# Patient Record
Sex: FEMALE | Race: WHITE | Employment: OTHER | URBAN - METROPOLITAN AREA
[De-identification: names, ages, dates, MRNs, and addresses within clinical notes are randomized per-mention and may not be internally consistent; named-entity substitution may affect disease eponyms.]

---

## 2017-06-28 ENCOUNTER — TELEPHONE (OUTPATIENT)
Dept: PODIATRY CLINIC | Facility: CLINIC | Age: 67
End: 2017-06-28

## 2017-06-28 NOTE — TELEPHONE ENCOUNTER
Called pt and she states she got a pedicure 3 months ago and since then she noticed her right hallux toenail has not been growing. She took the polish off and noticed a white/ foggy appearance on the nail.  Then on 6/26/17 the whole toe became painful aroun

## 2017-06-28 NOTE — TELEPHONE ENCOUNTER
Pt states great toe on L foot is in pain, states it is painful to walk, states nail is an odd color and has not been growing since she went for a pedicure. Pt requesting appt sooner than next available. Pls advise thank you.

## 2017-06-29 ENCOUNTER — OFFICE VISIT (OUTPATIENT)
Dept: PODIATRY CLINIC | Facility: CLINIC | Age: 67
End: 2017-06-29

## 2017-06-29 DIAGNOSIS — L03.012 PARONYCHIA, LEFT: Primary | ICD-10-CM

## 2017-06-29 PROCEDURE — G0463 HOSPITAL OUTPT CLINIC VISIT: HCPCS | Performed by: PODIATRIST

## 2017-06-29 PROCEDURE — 99203 OFFICE O/P NEW LOW 30 MIN: CPT | Performed by: PODIATRIST

## 2017-06-29 RX ORDER — LISINOPRIL 20 MG/1
20 TABLET ORAL
COMMUNITY

## 2017-06-29 RX ORDER — ALPRAZOLAM 1 MG/1
1 TABLET ORAL
COMMUNITY

## 2017-06-29 RX ORDER — TRIAMTERENE AND HYDROCHLOROTHIAZIDE 37.5; 25 MG/1; MG/1
1 CAPSULE ORAL
COMMUNITY

## 2017-06-29 RX ORDER — FOLIC ACID 1 MG/1
1 TABLET ORAL
COMMUNITY
End: 2017-06-29

## 2017-06-29 NOTE — PROGRESS NOTES
HPI:    Patient ID: Romeo Mina is a 69year old female. HPI  This 43-year-old female presents to the office today having not been seen in about 5 years. She port reports that during the last week or so the left great toe has become red and painful. Physical Exam  On physical exam pedal pulses are normal.  The distal aspect of this digit has some erythema. The nail is whitish superficially. There is palpable discomfort.   I was able to reduce this nail on the distal aspect and was unable to identif

## 2017-07-13 ENCOUNTER — OFFICE VISIT (OUTPATIENT)
Dept: PODIATRY CLINIC | Facility: CLINIC | Age: 67
End: 2017-07-13

## 2017-07-13 DIAGNOSIS — B35.1 ONYCHOMYCOSIS: ICD-10-CM

## 2017-07-13 DIAGNOSIS — L03.012 PARONYCHIA, LEFT: Primary | ICD-10-CM

## 2017-07-13 PROCEDURE — 99213 OFFICE O/P EST LOW 20 MIN: CPT | Performed by: PODIATRIST

## 2017-07-13 PROCEDURE — G0463 HOSPITAL OUTPT CLINIC VISIT: HCPCS | Performed by: PODIATRIST

## 2017-07-13 NOTE — PROGRESS NOTES
HPI:    Patient ID: Jaron Gonsalez is a 69year old female. HPI  This 68-year-old female called a few days after the last visit and requested an antibiotic. She was placed on Duricef 500 mg. Patient states today it is doing fine.   She removed all chris Allergies   PHYSICAL EXAM:   Physical Exam  On physical exam pedal pulses are normal.  There is no clinical evidence of edema nor erythema.   Lateral border of the left great toe demonstrates no edema nor erythema there is very minor discomfort but healing

## 2018-01-09 ENCOUNTER — OFFICE VISIT (OUTPATIENT)
Dept: RHEUMATOLOGY | Facility: CLINIC | Age: 68
End: 2018-01-09

## 2018-01-09 ENCOUNTER — HOSPITAL ENCOUNTER (OUTPATIENT)
Dept: GENERAL RADIOLOGY | Age: 68
Discharge: HOME | End: 2018-01-09
Attending: INTERNAL MEDICINE | Admitting: INTERNAL MEDICINE
Payer: MEDICARE

## 2018-01-09 VITALS
BODY MASS INDEX: 30.83 KG/M2 | DIASTOLIC BLOOD PRESSURE: 69 MMHG | SYSTOLIC BLOOD PRESSURE: 109 MMHG | HEART RATE: 94 BPM | WEIGHT: 174 LBS | HEIGHT: 63 IN

## 2018-01-09 DIAGNOSIS — M05.79 RHEUMATOID ARTHRITIS INVOLVING MULTIPLE SITES WITH POSITIVE RHEUMATOID FACTOR (HCC): ICD-10-CM

## 2018-01-09 DIAGNOSIS — Z51.81 ENCOUNTER FOR THERAPEUTIC DRUG MONITORING: ICD-10-CM

## 2018-01-09 DIAGNOSIS — M05.79 RHEUMATOID ARTHRITIS INVOLVING MULTIPLE SITES WITH POSITIVE RHEUMATOID FACTOR (HCC): Primary | ICD-10-CM

## 2018-01-09 DIAGNOSIS — M54.12 CERVICAL RADICULOPATHY: ICD-10-CM

## 2018-01-09 PROCEDURE — G0463 HOSPITAL OUTPT CLINIC VISIT: HCPCS | Performed by: INTERNAL MEDICINE

## 2018-01-09 PROCEDURE — 99204 OFFICE O/P NEW MOD 45 MIN: CPT | Performed by: INTERNAL MEDICINE

## 2018-01-09 PROCEDURE — 73130 X-RAY EXAM OF HAND: CPT | Performed by: INTERNAL MEDICINE

## 2018-01-09 RX ORDER — FOLIC ACID 1 MG/1
TABLET ORAL
Qty: 90 TABLET | Refills: 3 | Status: SHIPPED | OUTPATIENT
Start: 2018-01-09 | End: 2019-01-18

## 2018-01-09 NOTE — PROGRESS NOTES
Dear Dr. Dipti Rosenberg:    I saw your patient Pretty Bhatt in consultation this afternoon at your request for evaluation of rheumatoid arthritis. As you know, she is a delightful retired nurse, who developed arthritis starting in 2003.   She recalls it starting i creatinine of 1.23, alkaline phosphatase 139. CBC was normal.    Past medical history:  She has hypertension, high cholesterol, acid reflux, depression. Her right hip was replaced in November 2016 for osteoarthritis.   3-1/2 years ago she had a bulging di without hepatosplenomegaly or tenderness. Normal bowel sounds. No lower extremity edema. Neck motion is limited in all directions. Shoulders move well. Elbows and wrists are normal.  No obvious synovitis across her hand second or third MCP joints.   Gr

## 2018-01-26 ENCOUNTER — APPOINTMENT (OUTPATIENT)
Dept: LAB | Facility: HOSPITAL | Age: 68
End: 2018-01-26
Attending: INTERNAL MEDICINE
Payer: MEDICARE

## 2018-01-26 DIAGNOSIS — Z51.81 ENCOUNTER FOR THERAPEUTIC DRUG MONITORING: ICD-10-CM

## 2018-01-26 DIAGNOSIS — M05.79 RHEUMATOID ARTHRITIS INVOLVING MULTIPLE SITES WITH POSITIVE RHEUMATOID FACTOR (HCC): ICD-10-CM

## 2018-01-26 LAB
ALBUMIN SERPL BCP-MCNC: 3.5 G/DL (ref 3.5–4.8)
ALBUMIN/GLOB SERPL: 1.1 {RATIO} (ref 1–2)
ALP SERPL-CCNC: 112 U/L (ref 32–100)
ALT SERPL-CCNC: 25 U/L (ref 14–54)
ANION GAP SERPL CALC-SCNC: 12 MMOL/L (ref 0–18)
AST SERPL-CCNC: 25 U/L (ref 15–41)
BILIRUB SERPL-MCNC: 0.4 MG/DL (ref 0.3–1.2)
BUN SERPL-MCNC: 22 MG/DL (ref 8–20)
BUN/CREAT SERPL: 16.9 (ref 10–20)
CALCIUM SERPL-MCNC: 9.5 MG/DL (ref 8.5–10.5)
CHLORIDE SERPL-SCNC: 102 MMOL/L (ref 95–110)
CO2 SERPL-SCNC: 25 MMOL/L (ref 22–32)
CREAT SERPL-MCNC: 1.3 MG/DL (ref 0.5–1.5)
CRP SERPL-MCNC: 1.5 MG/DL (ref 0–0.9)
ERYTHROCYTE [DISTWIDTH] IN BLOOD BY AUTOMATED COUNT: 14.1 % (ref 11–15)
ERYTHROCYTE [SEDIMENTATION RATE] IN BLOOD: 34 MM/HR (ref 0–30)
GLOBULIN PLAS-MCNC: 3.2 G/DL (ref 2.5–3.7)
GLUCOSE SERPL-MCNC: 96 MG/DL (ref 70–99)
HCT VFR BLD AUTO: 36.2 % (ref 35–48)
HGB BLD-MCNC: 11.7 G/DL (ref 12–16)
MCH RBC QN AUTO: 29.9 PG (ref 27–32)
MCHC RBC AUTO-ENTMCNC: 32.5 G/DL (ref 32–37)
MCV RBC AUTO: 92 FL (ref 80–100)
OSMOLALITY UR CALC.SUM OF ELEC: 291 MOSM/KG (ref 275–295)
PLATELET # BLD AUTO: 296 K/UL (ref 140–400)
PMV BLD AUTO: 7.3 FL (ref 7.4–10.3)
POTASSIUM SERPL-SCNC: 4.2 MMOL/L (ref 3.3–5.1)
PROT SERPL-MCNC: 6.7 G/DL (ref 5.9–8.4)
RBC # BLD AUTO: 3.93 M/UL (ref 3.7–5.4)
RHEUMATOID FACT SER QL: 95 IU/ML
SODIUM SERPL-SCNC: 139 MMOL/L (ref 136–144)
WBC # BLD AUTO: 6.5 K/UL (ref 4–11)

## 2018-01-26 PROCEDURE — 86140 C-REACTIVE PROTEIN: CPT

## 2018-01-26 PROCEDURE — 86200 CCP ANTIBODY: CPT

## 2018-01-26 PROCEDURE — 86431 RHEUMATOID FACTOR QUANT: CPT

## 2018-01-26 PROCEDURE — 36415 COLL VENOUS BLD VENIPUNCTURE: CPT

## 2018-01-26 PROCEDURE — 85027 COMPLETE CBC AUTOMATED: CPT

## 2018-01-26 PROCEDURE — 85652 RBC SED RATE AUTOMATED: CPT

## 2018-01-26 PROCEDURE — 80053 COMPREHEN METABOLIC PANEL: CPT

## 2018-01-30 ENCOUNTER — OFFICE VISIT (OUTPATIENT)
Dept: RHEUMATOLOGY | Facility: CLINIC | Age: 68
End: 2018-01-30

## 2018-01-30 VITALS
BODY MASS INDEX: 30.8 KG/M2 | HEART RATE: 99 BPM | DIASTOLIC BLOOD PRESSURE: 69 MMHG | SYSTOLIC BLOOD PRESSURE: 113 MMHG | WEIGHT: 173.81 LBS | HEIGHT: 63 IN

## 2018-01-30 DIAGNOSIS — Z51.81 ENCOUNTER FOR THERAPEUTIC DRUG MONITORING: ICD-10-CM

## 2018-01-30 DIAGNOSIS — M05.79 RHEUMATOID ARTHRITIS INVOLVING MULTIPLE SITES WITH POSITIVE RHEUMATOID FACTOR (HCC): Primary | ICD-10-CM

## 2018-01-30 PROCEDURE — G0463 HOSPITAL OUTPT CLINIC VISIT: HCPCS | Performed by: INTERNAL MEDICINE

## 2018-01-30 PROCEDURE — 99213 OFFICE O/P EST LOW 20 MIN: CPT | Performed by: INTERNAL MEDICINE

## 2018-01-30 RX ORDER — CEFDINIR 300 MG/1
300 CAPSULE ORAL 2 TIMES DAILY
Refills: 0 | COMMUNITY
Start: 2018-01-26 | End: 2019-03-13

## 2018-01-30 NOTE — PROGRESS NOTES
Dear Dr. Lanette Cohen:    I saw Helio Rachel in follow-up this afternoon in my rheumatology clinic. She occasionally has swelling in her right third finger, and it can take all day to loosen. Her right arm can occasionally cramp.     She has not yet scheduled t

## 2018-01-31 LAB — CCP IGG SERPL-ACNC: >340 U/ML (ref 0–6.9)

## 2018-03-22 ENCOUNTER — LAB REQUISITION (OUTPATIENT)
Dept: LAB | Facility: HOSPITAL | Age: 68
End: 2018-03-22
Payer: MEDICARE

## 2018-03-22 DIAGNOSIS — L02.91 CUTANEOUS ABSCESS: ICD-10-CM

## 2018-03-22 PROCEDURE — 87075 CULTR BACTERIA EXCEPT BLOOD: CPT | Performed by: DERMATOLOGY

## 2018-03-22 PROCEDURE — 87205 SMEAR GRAM STAIN: CPT | Performed by: DERMATOLOGY

## 2018-03-22 PROCEDURE — 87070 CULTURE OTHR SPECIMN AEROBIC: CPT | Performed by: DERMATOLOGY

## 2018-05-18 ENCOUNTER — TELEPHONE (OUTPATIENT)
Dept: INTERNAL MEDICINE CLINIC | Facility: CLINIC | Age: 68
End: 2018-05-18

## 2018-05-18 NOTE — TELEPHONE ENCOUNTER
Patient came to drop off parking placard.  Please complete and call patient when completed at [phone]

## 2018-05-21 NOTE — TELEPHONE ENCOUNTER
Patient aware Parking Placard form ready for pickup at [facility].

## 2018-05-30 ENCOUNTER — BH HISTORICAL (OUTPATIENT)
Dept: OTHER | Age: 68
End: 2018-05-30

## 2018-06-13 ENCOUNTER — BH HISTORICAL (OUTPATIENT)
Dept: OTHER | Age: 68
End: 2018-06-13

## 2018-06-20 ENCOUNTER — BH HISTORICAL (OUTPATIENT)
Dept: OTHER | Age: 68
End: 2018-06-20

## 2019-01-18 RX ORDER — FOLIC ACID 1 MG/1
TABLET ORAL
Qty: 90 TABLET | Refills: 3 | Status: SHIPPED | OUTPATIENT
Start: 2019-01-18

## 2019-01-18 NOTE — TELEPHONE ENCOUNTER
FOLIC ACID 1 MG Oral Tab  Last refilled: 1/9/18  #90 tablet 3 refills  LOV: 1/30/18  No future appointments. Per pharmacy. Madie Rhodes \"To pharmacy: PT SAYS SHE IS OUT AND WILL MAKE AN APOINTMENT, IS IT POSSIBLE TO SEND A REFILL BEFORE PT IS SEEN? \"

## 2019-03-13 ENCOUNTER — OFFICE VISIT (OUTPATIENT)
Dept: RHEUMATOLOGY | Facility: CLINIC | Age: 69
End: 2019-03-13
Payer: MEDICARE

## 2019-03-13 VITALS
HEART RATE: 90 BPM | HEIGHT: 63 IN | SYSTOLIC BLOOD PRESSURE: 99 MMHG | WEIGHT: 176 LBS | DIASTOLIC BLOOD PRESSURE: 65 MMHG | BODY MASS INDEX: 31.18 KG/M2

## 2019-03-13 DIAGNOSIS — M05.79 RHEUMATOID ARTHRITIS INVOLVING MULTIPLE SITES WITH POSITIVE RHEUMATOID FACTOR (HCC): Primary | ICD-10-CM

## 2019-03-13 DIAGNOSIS — Z51.81 ENCOUNTER FOR THERAPEUTIC DRUG MONITORING: ICD-10-CM

## 2019-03-13 PROCEDURE — G0463 HOSPITAL OUTPT CLINIC VISIT: HCPCS | Performed by: INTERNAL MEDICINE

## 2019-03-13 PROCEDURE — 99214 OFFICE O/P EST MOD 30 MIN: CPT | Performed by: INTERNAL MEDICINE

## 2019-03-13 NOTE — PROGRESS NOTES
HPI:    Patient ID: Ronen Andres is a 77year old female. Celso Pennington is a 64-year-old patient of Dr. Danny Asencio, with CCP and RF positive rheumatoid arthritis. I last saw her January 30th, 2018. She has continued to take 12.5 mg of methotrexate weekly.   He Atorvastatin Calcium (LIPITOR) 40 MG Oral Tab Take 1 tablet by mouth daily. Disp:  Rfl:    Triamterene-HCTZ (DYAZIDE) 37.5-25 MG Oral Cap Take 1 capsule by mouth daily.  Disp:  Rfl:    alprazolam (XANAX) 1 MG Oral Tab Take 1 mg by mouth nightly as needed Protein      CBC W Differential W Platelet      Creatinine, Serum      Sed Rate, Westergren (Automated)      Meds This Visit:  Requested Prescriptions      No prescriptions requested or ordered in this encounter       Imaging & Referrals:  None       ID#[id number]

## 2019-03-21 RX ORDER — METHOTREXATE 2.5 MG/1
TABLET ORAL
Qty: 78 TABLET | Refills: 0 | Status: SHIPPED | OUTPATIENT
Start: 2019-03-21 | End: 2019-06-17

## 2019-03-21 NOTE — TELEPHONE ENCOUNTER
LOV:3-13  Last Filled:1-30-18, #78 with 3 refills  Labs:1-26-18, AST 25  No future appointments.     Please Advise

## 2019-06-18 NOTE — TELEPHONE ENCOUNTER
Last filled: 3/21/19 #78 tab with 0 refills  LOV: 3/13/19  No future appointments. Labs:  1/26/18    ASSESSMENT/PLAN:      1. CCP and RF positive rheumatoid arthritis. Controlled on methotrexate to 12,5 mg weekly.   She will remain on folate supplementat

## 2019-11-15 RX ORDER — METHOTREXATE 2.5 MG/1
TABLET ORAL
Qty: 30 TABLET | Refills: 0 | Status: SHIPPED | OUTPATIENT
Start: 2019-11-15 | End: 2022-10-04

## 2019-11-15 NOTE — TELEPHONE ENCOUNTER
LOV: 3/13/2019   Future Appointments   Date Time Provider Les Alvarado   12/27/2019 12:00 PM Carlos Mix MD [facility]     Labs: *last labs 1/26/2018* repeat? Component      Latest Ref Rng & Units 1/26/2018   Glucose      70 - 99 mg/dL 96   Sodium      136 - 144 mmol/L 139   Potassium      3.3 - 5.1 mmol/L 4.2   Chloride      95 - 110 mmol/L 102   Carbon Dioxide, Total      22 - 32 mmol/L 25   BUN      8 - 20 mg/dL 22 (H)   CREATININE      0.50 - 1.50 mg/dL 1.30   CALCIUM      8.5 - 10.5 mg/dL 9.5   ALT (SGPT)      14 - 54 U/L 25   AST (SGOT)      15 - 41 U/L 25   ALKALINE PHOSPHATASE      32 - 100 U/L 112 (H)   Total Bilirubin      0.3 - 1.2 mg/dL 0.4   TOTAL PROTEIN      5.9 - 8.4 g/dL 6.7   Albumin      3.5 - 4.8 g/dL 3.5   Globulin      2.5 - 3.7 g/dL 3.2   A/G Ratio      1.0 - 2.0 1.1   ANION GAP      0 - 18 mmol/L 12   BUN/CREAT Ratio      10.0 - 20.0 16.9   CALCULATED OSMOLALITY      275 - 295 mOsm/kg 291   eGFR NON-AFR.  AMERICAN      >=60 41 (L)   eGFR       >=60 49 (L)   WBC      4.0 - 11.0 K/UL 6.5   RBC      3.70 - 5.40 M/UL 3.93   Hemoglobin      12.0 - 16.0 g/dL 11.7 (L)   Hematocrit      35.0 - 48.0 % 36.2   MCV      80.0 - 100.0 fL 92.0   MCH      27.0 - 32.0 pg 29.9   MCHC      32.0 - 37.0 g/dl 32.5   RDW      11.0 - 15.0 % 14.1   Platelet Count      353 - 400 K/   MEAN PLATELET VOLUME      7.4 - 10.3 fL 7.3 (L)

## 2020-02-14 ENCOUNTER — TELEPHONE (OUTPATIENT)
Dept: RHEUMATOLOGY | Facility: CLINIC | Age: 70
End: 2020-02-14

## 2020-02-14 NOTE — TELEPHONE ENCOUNTER
Patient states she is out of medication and requesting a callback to explain she needs medication before her appointment on 2/25, please advise

## 2020-02-14 NOTE — TELEPHONE ENCOUNTER
Per 11/15/2019 encounter, Dr. Heidi Valerio only approved a 1-month refill. Pt is due for labs and f/u appt. Left message informing pt and encouraged her to keep appt as scheduled on 2/25.      Future Appointments   Date Time Provider Les Alvarado   2/

## 2020-02-21 NOTE — TELEPHONE ENCOUNTER
Left detailed message informing pt of below. She was encouraged to complete labs and keep 2/25 appt.

## 2020-02-21 NOTE — TELEPHONE ENCOUNTER
Her folic acid prescription was refilled by Dr. Mikey Lewis. I will see her on February 25th of 2020. She still has to do her monitoring labs. I will refill her methotrexate at that time, if her labs are fine.   Holding methotrexate another week will n

## 2021-07-14 ENCOUNTER — APPOINTMENT (OUTPATIENT)
Dept: LAB | Age: 71
End: 2021-07-14

## 2022-06-21 ENCOUNTER — TELEPHONE (OUTPATIENT)
Dept: RHEUMATOLOGY | Facility: CLINIC | Age: 72
End: 2022-06-21

## 2022-06-21 NOTE — TELEPHONE ENCOUNTER
Spoke to patient, she is willing to come to in to [place] office and will wait for the form to be completed. She was informed to come to  office and give it to them and they can give it to Gurvinder. [place], LPN notified.

## 2022-06-21 NOTE — TELEPHONE ENCOUNTER
Patient is requesting a callback to discuss getting the permanent placard renewal form completed on today.

## 2022-06-24 ENCOUNTER — TELEPHONE (OUTPATIENT)
Dept: RHEUMATOLOGY | Facility: CLINIC | Age: 72
End: 2022-06-24

## 2022-06-24 NOTE — TELEPHONE ENCOUNTER
Patient rescheduled missed appt for 7/25/22 at [facility], aware of location and time.

## 2022-10-04 ENCOUNTER — OFFICE VISIT (OUTPATIENT)
Dept: RHEUMATOLOGY | Facility: CLINIC | Age: 72
End: 2022-10-04
Payer: MEDICARE

## 2022-10-04 VITALS
HEIGHT: 63 IN | WEIGHT: 169 LBS | BODY MASS INDEX: 29.95 KG/M2 | SYSTOLIC BLOOD PRESSURE: 113 MMHG | DIASTOLIC BLOOD PRESSURE: 66 MMHG | HEART RATE: 76 BPM

## 2022-10-04 DIAGNOSIS — Z51.81 THERAPEUTIC DRUG MONITORING: ICD-10-CM

## 2022-10-04 DIAGNOSIS — M05.79 RHEUMATOID ARTHRITIS INVOLVING MULTIPLE SITES WITH POSITIVE RHEUMATOID FACTOR (HCC): Primary | ICD-10-CM

## 2022-10-04 DIAGNOSIS — K21.9 GASTROESOPHAGEAL REFLUX DISEASE, UNSPECIFIED WHETHER ESOPHAGITIS PRESENT: ICD-10-CM

## 2022-10-04 PROCEDURE — 99214 OFFICE O/P EST MOD 30 MIN: CPT | Performed by: INTERNAL MEDICINE

## 2022-10-04 RX ORDER — FOLIC ACID 1 MG/1
TABLET ORAL
Qty: 90 TABLET | Refills: 3 | Status: SHIPPED | OUTPATIENT
Start: 2022-10-04

## 2022-10-04 RX ORDER — METOPROLOL SUCCINATE 50 MG/1
25 TABLET, EXTENDED RELEASE ORAL DAILY
COMMUNITY
Start: 2022-08-08

## 2022-10-04 RX ORDER — METOPROLOL SUCCINATE 100 MG/1
100 TABLET, EXTENDED RELEASE ORAL DAILY
COMMUNITY
Start: 2022-08-09

## 2022-10-04 RX ORDER — ESOMEPRAZOLE MAGNESIUM 40 MG/1
CAPSULE, DELAYED RELEASE ORAL
Qty: 90 CAPSULE | Refills: 3 | Status: SHIPPED | OUTPATIENT
Start: 2022-10-04

## 2022-10-04 NOTE — PROGRESS NOTES
Marion David is now a 77-year-old woman,  with CCP and RF positive rheumatoid arthritis. I last saw her March 13th of 2019, doing well on methotrexate 12.5 mg weekly. Since then, she has been seeing Dr. Marlo Latif, rheumatologist at [facility]. He increased her methotrexate to 17.5 mg weekly which she is tolerating well. She feels like her rheumatoid control is good overall. Her right hand second MCP joint can be swollen as can her right thumb MCP joint. He does not feel like more damage is being done, and does not feel like her  is weakening over time. She generally rests well at night but has to get up to urinate. She is usually refreshed on awakening. Her energy could be better. When she gets out of bed she denies that her joints are more swollen. It can take an hour to loosen. Her last sed rate at [facility] in July of 2021 was normal at 20. CBC and CMP were done at [facility] August 30th of 2022. CMP was normal except creatinine 1.26 (42), alkaline phosphatase 126. CBC normal.    Since I saw her, she was diagnosed with renal cell carcinoma, and had partial nephrectomy. She has LVH. She has renal insufficiency. Endocrinology does not believe that she has prediabetes. She saw a dietitian. She has hypertension. She has sinus arrhythmia, with tachycardia and shortness of breath occasionally. She has lower extremity edema. She has continued to have trouble with her neck and arms. She can occasionally have discomfort going down either arm, and had numbness and tingling in her left hand. She has cervical spondylosis. She had a chest x-ray done that showed emphysema, and plans to follow-up o this at [facility] clinic. Review of Systems   Constitutional: Negative for diaphoresis, fatigue and fever. Eyes: Negative for pain. Respiratory: Occasional shortness of breath. Cardiovascular: Negative for chest pain. Gastrointestinal: He may have a hernia on her lower left abdomen.    Skin: Negative for rash. Hematological: Negative for adenopathy. Allergies:No Known Allergies     PHYSICAL EXAM:     Constitutional: She is oriented to person, place, and time. She appears well-developed. Blood pressure 113/66, pulse 76, height 5' 3\" (1.6 m), weight 169 lb (76.7 kg). HENT:   Head: Normocephalic. Eyes: Conjunctivae are normal.   Cardiovascular: Normal rate, regular rhythm and normal heart sounds. Pulmonary/Chest: Effort normal and breath sounds normal.   Abdominal: Soft. Bowel sounds are normal. She exhibits no mass. There is no tenderness. There is no rebound and no guarding. Musculoskeletal: She exhibits no edema. There is not active synovitis in her elbows or wrists, or hands.  strength is good bilaterally. There may be mild synovial thickening of her right second MCP joint. Lymphadenopathy:     She has no cervical adenopathy. Neurological: She is alert and oriented to person, place, and time. Skin: No rash noted. Psychiatric: She has a normal mood and affect. ASSESSMENT/PLAN:     1.  CCP and RF positive rheumatoid arthritis. Controlled on methotrexate to 17.5 mg weekly. She will remain on folate supplementation. Her prescriptions were refilled. She will follow-up in 2 months, after monitoring labs and sed rate. 2.  Severe spondylosis of her spine. Probable h/o arm radiculopathy. 3.  Acid reflux. I refilled her esomeprazole prescription for her.   She will be establishing with a new primary care provider

## 2024-02-20 ENCOUNTER — LAB ENCOUNTER (OUTPATIENT)
Dept: LAB | Facility: HOSPITAL | Age: 74
End: 2024-02-20
Attending: DERMATOLOGY
Payer: MEDICARE

## 2024-02-20 DIAGNOSIS — C44.91 BASAL CELL CARCINOMA: ICD-10-CM

## 2024-02-20 PROCEDURE — 87635 SARS-COV-2 COVID-19 AMP PRB: CPT

## 2024-02-21 LAB — SARS-COV-2 RNA RESP QL NAA+PROBE: NOT DETECTED
